# Patient Record
Sex: MALE
[De-identification: names, ages, dates, MRNs, and addresses within clinical notes are randomized per-mention and may not be internally consistent; named-entity substitution may affect disease eponyms.]

---

## 2021-01-08 PROBLEM — Z00.00 ENCOUNTER FOR PREVENTIVE HEALTH EXAMINATION: Status: ACTIVE | Noted: 2021-01-08

## 2021-01-15 ENCOUNTER — NON-APPOINTMENT (OUTPATIENT)
Age: 26
End: 2021-01-15

## 2021-01-19 ENCOUNTER — APPOINTMENT (OUTPATIENT)
Dept: UROLOGY | Facility: CLINIC | Age: 26
End: 2021-01-19
Payer: COMMERCIAL

## 2021-01-19 VITALS
SYSTOLIC BLOOD PRESSURE: 111 MMHG | WEIGHT: 132 LBS | HEIGHT: 65 IN | BODY MASS INDEX: 21.99 KG/M2 | HEART RATE: 79 BPM | TEMPERATURE: 98.4 F | DIASTOLIC BLOOD PRESSURE: 73 MMHG

## 2021-01-19 PROCEDURE — 99204 OFFICE O/P NEW MOD 45 MIN: CPT

## 2021-01-19 PROCEDURE — 99072 ADDL SUPL MATRL&STAF TM PHE: CPT

## 2021-01-19 RX ORDER — SILDENAFIL 100 MG/1
100 TABLET, FILM COATED ORAL
Qty: 3 | Refills: 11 | Status: ACTIVE | COMMUNITY
Start: 2021-01-19 | End: 1900-01-01

## 2021-01-19 NOTE — HISTORY OF PRESENT ILLNESS
[FreeTextEntry1] : 25M  to Angi Ho (21F) comes in with failure to concevie x 2.5 years. no previous pregnancy. wife with irregular menses/PCOS. reportedly ovulatory follwling with [facility]. rare ED. +ejaculatory. good libido. good energy levels. good memory. rare dysuria. reports SA on 2 occasions in AM with difficulty to ejaculate. he reports clear difference between that and an average sexual encounter. SA x 2 azoospermia. labs from 10/2019:\par LH 1.5\par FSH 4.4\par

## 2021-01-19 NOTE — ASSESSMENT
[FreeTextEntry1] : liekly congential absence of vas deferens\par repeat hormonal eval\par CF testing\par scrotal sonogram\par repeat SA with viagra\par reviewed options for sperm retrieval if confirmed absence of vas

## 2021-01-19 NOTE — PHYSICAL EXAM
[FreeTextEntry1] : left grade I varicocele. right no clear varicocle. 10cc bilateral firm testes. vasa not clearly palp. palp epididymal head

## 2021-01-25 LAB — CFTR MUT TESTED BLD/T: NEGATIVE

## 2021-02-09 ENCOUNTER — APPOINTMENT (OUTPATIENT)
Dept: UROLOGY | Facility: CLINIC | Age: 26
End: 2021-02-09
Payer: COMMERCIAL

## 2021-02-09 VITALS
DIASTOLIC BLOOD PRESSURE: 73 MMHG | SYSTOLIC BLOOD PRESSURE: 122 MMHG | BODY MASS INDEX: 22.49 KG/M2 | TEMPERATURE: 98.5 F | HEIGHT: 65 IN | WEIGHT: 135 LBS | HEART RATE: 94 BPM

## 2021-02-09 PROCEDURE — 99213 OFFICE O/P EST LOW 20 MIN: CPT

## 2021-02-09 PROCEDURE — 99072 ADDL SUPL MATRL&STAF TM PHE: CPT

## 2021-02-09 NOTE — ASSESSMENT
[FreeTextEntry1] : non palp vasa bialterally\par CF testing negative\par repeat SA\par for MRI pelvis - eval for presence of vas deferens and seminal vesicle\par f/u after

## 2021-02-09 NOTE — HISTORY OF PRESENT ILLNESS
[FreeTextEntry1] : returns for follow up\par \par LH 3.9\par FSH 4.2\par E2 8.4\par scrotal ultrasound bialteral varicoceles. atrophic epididymis bilaterally

## 2021-03-16 ENCOUNTER — APPOINTMENT (OUTPATIENT)
Dept: UROLOGY | Facility: CLINIC | Age: 26
End: 2021-03-16

## 2021-04-13 ENCOUNTER — APPOINTMENT (OUTPATIENT)
Dept: UROLOGY | Facility: CLINIC | Age: 26
End: 2021-04-13
Payer: COMMERCIAL

## 2021-04-13 VITALS — SYSTOLIC BLOOD PRESSURE: 118 MMHG | HEART RATE: 81 BPM | DIASTOLIC BLOOD PRESSURE: 82 MMHG | TEMPERATURE: 98.8 F

## 2021-04-13 DIAGNOSIS — Q55.4 OTHER CONGENITAL MALFORMATIONS OF VAS DEFERENS, EPIDIDYMIS, SEMINAL VESICLES AND PROSTATE: ICD-10-CM

## 2021-04-13 PROCEDURE — 99214 OFFICE O/P EST MOD 30 MIN: CPT | Mod: 57

## 2021-04-13 PROCEDURE — 99072 ADDL SUPL MATRL&STAF TM PHE: CPT

## 2021-04-13 NOTE — HISTORY OF PRESENT ILLNESS
[FreeTextEntry1] : returns for follow up\par seen by brant for second opinion\par MRI confirms absence/atrophy of bilateral SV\par atrophic epididymides on sonogram\par here to discuss surgical retrieval optoins

## 2021-04-13 NOTE — ASSESSMENT
[FreeTextEntry1] : congenital bilateral absence of vas deferens\par We spoke about microsurgical epididymal sperm aspiration. He understands that it will be performed under general anesthesia. Risks including a risk of hematoma and infection were discussed. There is a suggestion of improved IVF performance in epididymal sperm versus testicular sperm, but overall the benefit is marginal. Higher sperm concentrations are expected and he is likely to have plenty of sperm cryopreserved for future cycles as needed.\par We spoke at length about the role of office based TESE with possible percutaneous epididymal sperm aspiration. Risks- bleeding, pain, infection, hypogonadism all discussed.  Risk of secondary epididymal obstruction also discussed. Procedure will be done under local anesthesia. He understands that sperm yields tend to be lower with this and that he may require future procedure for repeat sperm extraction if IVF attempts fail.\par he will review with his rabbi\ad will scheudle for KOFFI

## 2021-04-19 LAB
ANION GAP SERPL CALC-SCNC: 9 MMOL/L
APPEARANCE: CLEAR
APTT BLD: 34.1 SEC
BACTERIA UR CULT: NORMAL
BACTERIA: NEGATIVE
BASOPHILS # BLD AUTO: 0.05 K/UL
BASOPHILS NFR BLD AUTO: 1 %
BILIRUBIN URINE: NEGATIVE
BLOOD URINE: NEGATIVE
BUN SERPL-MCNC: 13 MG/DL
CALCIUM SERPL-MCNC: 10.5 MG/DL
CHLORIDE SERPL-SCNC: 103 MMOL/L
CO2 SERPL-SCNC: 27 MMOL/L
COLOR: NORMAL
CREAT SERPL-MCNC: 0.8 MG/DL
EOSINOPHIL # BLD AUTO: 0.09 K/UL
EOSINOPHIL NFR BLD AUTO: 1.8 %
GLUCOSE QUALITATIVE U: NEGATIVE
GLUCOSE SERPL-MCNC: 81 MG/DL
HCT VFR BLD CALC: 45.3 %
HGB BLD-MCNC: 15 G/DL
HYALINE CASTS: 0 /LPF
IMM GRANULOCYTES NFR BLD AUTO: 0.2 %
INR PPP: 0.98 RATIO
KETONES URINE: NEGATIVE
LEUKOCYTE ESTERASE URINE: NEGATIVE
LYMPHOCYTES # BLD AUTO: 1.88 K/UL
LYMPHOCYTES NFR BLD AUTO: 38.2 %
MAN DIFF?: NORMAL
MCHC RBC-ENTMCNC: 28.6 PG
MCHC RBC-ENTMCNC: 33.1 GM/DL
MCV RBC AUTO: 86.5 FL
MICROSCOPIC-UA: NORMAL
MONOCYTES # BLD AUTO: 0.66 K/UL
MONOCYTES NFR BLD AUTO: 13.4 %
NEUTROPHILS # BLD AUTO: 2.23 K/UL
NEUTROPHILS NFR BLD AUTO: 45.4 %
NITRITE URINE: NEGATIVE
PH URINE: 7
PLATELET # BLD AUTO: 306 K/UL
POTASSIUM SERPL-SCNC: 4.3 MMOL/L
PROTEIN URINE: NEGATIVE
PT BLD: 11.6 SEC
RBC # BLD: 5.24 M/UL
RBC # FLD: 12.4 %
RED BLOOD CELLS URINE: 1 /HPF
SODIUM SERPL-SCNC: 140 MMOL/L
SPECIFIC GRAVITY URINE: 1.02
SQUAMOUS EPITHELIAL CELLS: 0 /HPF
UROBILINOGEN URINE: NORMAL
WBC # FLD AUTO: 4.92 K/UL
WHITE BLOOD CELLS URINE: 0 /HPF

## 2021-05-24 ENCOUNTER — APPOINTMENT (OUTPATIENT)
Dept: UROLOGY | Facility: AMBULATORY SURGERY CENTER | Age: 26
End: 2021-05-24

## 2024-06-05 NOTE — PHYSICAL EXAM
[FreeTextEntry1] : non palp vasa bilaterally. palp small epididymis. 
No Resident Program within this Specialty at this Location